# Patient Record
Sex: MALE | Race: WHITE | Employment: UNEMPLOYED | URBAN - METROPOLITAN AREA
[De-identification: names, ages, dates, MRNs, and addresses within clinical notes are randomized per-mention and may not be internally consistent; named-entity substitution may affect disease eponyms.]

---

## 2018-01-05 ENCOUNTER — TELEPHONE (OUTPATIENT)
Dept: FAMILY MEDICINE CLINIC | Facility: CLINIC | Age: 23
End: 2018-01-05

## 2018-02-23 ENCOUNTER — OFFICE VISIT (OUTPATIENT)
Dept: FAMILY MEDICINE CLINIC | Facility: CLINIC | Age: 23
End: 2018-02-23

## 2018-02-23 DIAGNOSIS — E72.03 LOWE SYNDROME (HCC): Primary | ICD-10-CM

## 2018-02-23 PROCEDURE — 99213 OFFICE O/P EST LOW 20 MIN: CPT | Performed by: FAMILY MEDICINE

## 2018-02-23 PROCEDURE — 99212 OFFICE O/P EST SF 10 MIN: CPT | Performed by: FAMILY MEDICINE

## 2018-02-23 NOTE — PROGRESS NOTES
HPI:    Patient ID: Nithya Dodson is a 26year old male. HPI  Patient presents with:  Referral: report for guardianship due to pt being 26 legal age referral for  dedra surgery, opthomology     Review of Systems   Constitutional: Negative.     Psyc

## 2018-03-09 ENCOUNTER — TELEPHONE (OUTPATIENT)
Dept: FAMILY MEDICINE CLINIC | Facility: CLINIC | Age: 23
End: 2018-03-09

## 2018-03-09 NOTE — TELEPHONE ENCOUNTER
Dr. Eileen Russell,   see [facility] probate division form, This form needs to be filled out for court. In your pending folder.  Thanks

## 2018-03-09 NOTE — TELEPHONE ENCOUNTER
Patient mother dropped off form for court that needs to be completed she already had court yesterday 03/08/18 but they still need the form please call patient mother to  form when ready placed in dr Destiny Du folder

## 2018-03-15 NOTE — TELEPHONE ENCOUNTER
Called all the [employer] extension and nobody picking up the phone, called IM extension and they gave me another extension, when I called x [phone], finally somebody picked up the phone, advised that need to talk to the roomer and GE number given, states th

## 2018-03-15 NOTE — TELEPHONE ENCOUNTER
Office staff=Tried calling Schpatrick[employer]'s office regarding the form but with no response, will send the message back to MD , mother is calling regarding the form completion, please call her when ready to be .   Called mother and advised that office is no

## 2018-03-15 NOTE — TELEPHONE ENCOUNTER
Called Myah roomer of Dr Fabricio Alas and advised about the situation, states that she will mention it to him again and will call me back. Myah  Called back and states that she just spoke with Dr Fabricio Alas and states that he cannot do it today .   Called

## 2018-09-10 ENCOUNTER — OFFICE VISIT (OUTPATIENT)
Dept: SURGERY | Facility: CLINIC | Age: 23
End: 2018-09-10
Payer: COMMERCIAL

## 2018-09-10 DIAGNOSIS — D23.72 BENIGN NEOPLASM OF SKIN OF LOWER LIMB, INCLUDING HIP, LEFT: Primary | ICD-10-CM

## 2018-09-10 PROCEDURE — 99243 OFF/OP CNSLTJ NEW/EST LOW 30: CPT | Performed by: PLASTIC SURGERY

## 2018-09-10 PROCEDURE — 99212 OFFICE O/P EST SF 10 MIN: CPT | Performed by: PLASTIC SURGERY

## 2018-09-10 NOTE — H&P (VIEW-ONLY)
Quintin Fabian is a 26year old male that presents with Patient presents with:  Lesion: L Dorsal foot  . REFERRED BY:  No ref.  provider found    Pacemaker: No  Latex Allergy: no  Coumadin: No  Plavix: No  Other anticoagulants: No  Cardiac stents: No OTHER (SEE COMMENTS)    Comment:All eye drops caused redeness             Toxic reaction with redness and discharge - likely             RICARDO toxicity  Risperidone             OTHER (SEE COMMENTS)    Comment:seizures    Past Medical History:   Diagnosis Munir Not Asked        Past Sunlamp Treatments for Acne: Not Asked        History of tanning: Not Asked        Hx of Spending [place] of Time in Sun: Not Asked        Bad sunburns in the past: Not Asked        Tanning Salons in the Past: Not Asked        Hx o Mallika Vance MD

## 2018-09-10 NOTE — PROGRESS NOTES
Pt request for surgery signed by pt and witnessed and signed by RN. Pt to take OTC meds post-operatively; pt instructed to call the office if post-op pain is not relieved w/OTC meds.   Pre-Surgical Instruction Handout, and Dressing Protector Handout, given

## 2018-09-10 NOTE — H&P
Tania Bello is a 26year old male that presents with Patient presents with:  Lesion: L Dorsal foot  . REFERRED BY:  No ref.  provider found    Pacemaker: No  Latex Allergy: no  Coumadin: No  Plavix: No  Other anticoagulants: No  Cardiac stents: No OTHER (SEE COMMENTS)    Comment:All eye drops caused redeness             Toxic reaction with redness and discharge - likely             RICARDO toxicity  Risperidone             OTHER (SEE COMMENTS)    Comment:seizures    Past Medical History:   Diagnosis Munir Not Asked        Past Sunlamp Treatments for Acne: Not Asked        History of tanning: Not Asked        Hx of Spending [place] of Time in Sun: Not Asked        Bad sunburns in the past: Not Asked        Tanning Salons in the Past: Not Asked        Hx o Ivelisse Davis MD

## 2018-09-13 ENCOUNTER — TELEPHONE (OUTPATIENT)
Dept: SURGERY | Facility: CLINIC | Age: 23
End: 2018-09-13

## 2018-09-13 DIAGNOSIS — D23.72 DERMATOFIBROMA OF LEFT LOWER LEG: Primary | ICD-10-CM

## 2018-09-13 NOTE — TELEPHONE ENCOUNTER
Spoke to [facility] @ [facility] Fredis Gillespie (call ref# [id number])    Effective date: 1/1/17    Individual Deduct: $ 300.00 with $ 0 met  Family Deduct: $ 900.00 with $ 300.00 met  Individual OOP: $ 1,000.00 with $ 0 met  Family OOP:$ 2,000.00 with $ 376.57 met  Co-

## 2018-09-21 ENCOUNTER — ANESTHESIA EVENT (OUTPATIENT)
Dept: SURGERY | Facility: HOSPITAL | Age: 23
End: 2018-09-21
Payer: COMMERCIAL

## 2018-09-21 ENCOUNTER — HOSPITAL ENCOUNTER (OUTPATIENT)
Facility: HOSPITAL | Age: 23
Setting detail: HOSPITAL OUTPATIENT SURGERY
Discharge: HOME | End: 2018-09-21
Attending: PLASTIC SURGERY | Admitting: PLASTIC SURGERY
Payer: COMMERCIAL

## 2018-09-21 ENCOUNTER — HOSPITAL DOCUMENTATION (OUTPATIENT)
Dept: SURGERY | Facility: CLINIC | Age: 23
End: 2018-09-21

## 2018-09-21 ENCOUNTER — ANESTHESIA (OUTPATIENT)
Dept: SURGERY | Facility: HOSPITAL | Age: 23
End: 2018-09-21
Payer: COMMERCIAL

## 2018-09-21 VITALS
RESPIRATION RATE: 14 BRPM | HEIGHT: 60 IN | BODY MASS INDEX: 14.72 KG/M2 | OXYGEN SATURATION: 100 % | WEIGHT: 75 LBS | TEMPERATURE: 98 F | DIASTOLIC BLOOD PRESSURE: 85 MMHG | SYSTOLIC BLOOD PRESSURE: 119 MMHG | HEART RATE: 80 BPM

## 2018-09-21 DIAGNOSIS — D23.72 BENIGN NEOPLASM OF SKIN OF LOWER LIMB, INCLUDING HIP, LEFT: Primary | ICD-10-CM

## 2018-09-21 PROCEDURE — 14041 TIS TRNFR F/C/C/M/N/A/G/H/F: CPT | Performed by: PLASTIC SURGERY

## 2018-09-21 PROCEDURE — 0HXNXZZ TRANSFER LEFT FOOT SKIN, EXTERNAL APPROACH: ICD-10-PCS | Performed by: PLASTIC SURGERY

## 2018-09-21 PROCEDURE — 0HBNXZZ EXCISION OF LEFT FOOT SKIN, EXTERNAL APPROACH: ICD-10-PCS | Performed by: PLASTIC SURGERY

## 2018-09-21 RX ORDER — MORPHINE SULFATE 4 MG/ML
2 INJECTION, SOLUTION INTRAMUSCULAR; INTRAVENOUS EVERY 10 MIN PRN
Status: DISCONTINUED | OUTPATIENT
Start: 2018-09-21 | End: 2018-09-21

## 2018-09-21 RX ORDER — SODIUM CHLORIDE, SODIUM LACTATE, POTASSIUM CHLORIDE, CALCIUM CHLORIDE 600; 310; 30; 20 MG/100ML; MG/100ML; MG/100ML; MG/100ML
INJECTION, SOLUTION INTRAVENOUS CONTINUOUS
Status: DISCONTINUED | OUTPATIENT
Start: 2018-09-21 | End: 2018-09-21

## 2018-09-21 RX ORDER — METOCLOPRAMIDE 10 MG/1
10 TABLET ORAL ONCE
Status: DISCONTINUED | OUTPATIENT
Start: 2018-09-21 | End: 2018-09-21 | Stop reason: HOSPADM

## 2018-09-21 RX ORDER — ACETAMINOPHEN 500 MG
500 TABLET ORAL EVERY 4 HOURS PRN
Status: DISCONTINUED | OUTPATIENT
Start: 2018-09-21 | End: 2018-09-21

## 2018-09-21 RX ORDER — CEFAZOLIN SODIUM/WATER 2 G/20 ML
2 SYRINGE (ML) INTRAVENOUS ONCE
Status: COMPLETED | OUTPATIENT
Start: 2018-09-21 | End: 2018-09-21

## 2018-09-21 RX ORDER — ACETAMINOPHEN 500 MG
1000 TABLET ORAL ONCE
Status: DISCONTINUED | OUTPATIENT
Start: 2018-09-21 | End: 2018-09-21 | Stop reason: HOSPADM

## 2018-09-21 RX ORDER — MORPHINE SULFATE 10 MG/ML
6 INJECTION, SOLUTION INTRAMUSCULAR; INTRAVENOUS EVERY 10 MIN PRN
Status: DISCONTINUED | OUTPATIENT
Start: 2018-09-21 | End: 2018-09-21

## 2018-09-21 RX ORDER — DEXAMETHASONE SODIUM PHOSPHATE 4 MG/ML
VIAL (ML) INJECTION AS NEEDED
Status: DISCONTINUED | OUTPATIENT
Start: 2018-09-21 | End: 2018-09-21 | Stop reason: SURG

## 2018-09-21 RX ORDER — HYDROCODONE BITARTRATE AND ACETAMINOPHEN 5; 325 MG/1; MG/1
2 TABLET ORAL AS NEEDED
Status: DISCONTINUED | OUTPATIENT
Start: 2018-09-21 | End: 2018-09-21

## 2018-09-21 RX ORDER — MIDAZOLAM HYDROCHLORIDE 1 MG/ML
INJECTION INTRAMUSCULAR; INTRAVENOUS AS NEEDED
Status: DISCONTINUED | OUTPATIENT
Start: 2018-09-21 | End: 2018-09-21 | Stop reason: SURG

## 2018-09-21 RX ORDER — ONDANSETRON 2 MG/ML
INJECTION INTRAMUSCULAR; INTRAVENOUS AS NEEDED
Status: DISCONTINUED | OUTPATIENT
Start: 2018-09-21 | End: 2018-09-21 | Stop reason: SURG

## 2018-09-21 RX ORDER — NALOXONE HYDROCHLORIDE 0.4 MG/ML
80 INJECTION, SOLUTION INTRAMUSCULAR; INTRAVENOUS; SUBCUTANEOUS AS NEEDED
Status: DISCONTINUED | OUTPATIENT
Start: 2018-09-21 | End: 2018-09-21

## 2018-09-21 RX ORDER — FAMOTIDINE 20 MG/1
20 TABLET ORAL ONCE
Status: DISCONTINUED | OUTPATIENT
Start: 2018-09-21 | End: 2018-09-21 | Stop reason: HOSPADM

## 2018-09-21 RX ORDER — KETAMINE HYDROCHLORIDE 50 MG/ML
INJECTION, SOLUTION, CONCENTRATE INTRAMUSCULAR; INTRAVENOUS AS NEEDED
Status: DISCONTINUED | OUTPATIENT
Start: 2018-09-21 | End: 2018-09-21 | Stop reason: SURG

## 2018-09-21 RX ORDER — HYDROCODONE BITARTRATE AND ACETAMINOPHEN 5; 325 MG/1; MG/1
1 TABLET ORAL AS NEEDED
Status: DISCONTINUED | OUTPATIENT
Start: 2018-09-21 | End: 2018-09-21

## 2018-09-21 RX ORDER — MORPHINE SULFATE 4 MG/ML
4 INJECTION, SOLUTION INTRAMUSCULAR; INTRAVENOUS EVERY 10 MIN PRN
Status: DISCONTINUED | OUTPATIENT
Start: 2018-09-21 | End: 2018-09-21

## 2018-09-21 RX ORDER — HALOPERIDOL 5 MG/ML
0.25 INJECTION INTRAMUSCULAR ONCE AS NEEDED
Status: DISCONTINUED | OUTPATIENT
Start: 2018-09-21 | End: 2018-09-21

## 2018-09-21 RX ORDER — LIDOCAINE HYDROCHLORIDE AND EPINEPHRINE 5; 5 MG/ML; UG/ML
INJECTION, SOLUTION INFILTRATION; PERINEURAL AS NEEDED
Status: DISCONTINUED | OUTPATIENT
Start: 2018-09-21 | End: 2018-09-21 | Stop reason: HOSPADM

## 2018-09-21 RX ORDER — ONDANSETRON 2 MG/ML
4 INJECTION INTRAMUSCULAR; INTRAVENOUS ONCE AS NEEDED
Status: DISCONTINUED | OUTPATIENT
Start: 2018-09-21 | End: 2018-09-21

## 2018-09-21 RX ADMIN — SODIUM CHLORIDE, SODIUM LACTATE, POTASSIUM CHLORIDE, CALCIUM CHLORIDE: 600; 310; 30; 20 INJECTION, SOLUTION INTRAVENOUS at 09:45:00

## 2018-09-21 RX ADMIN — DEXAMETHASONE SODIUM PHOSPHATE 4 MG: 4 MG/ML VIAL (ML) INJECTION at 10:00:00

## 2018-09-21 RX ADMIN — CEFAZOLIN SODIUM/WATER 2 G: 2 G/20 ML SYRINGE (ML) INTRAVENOUS at 09:53:00

## 2018-09-21 RX ADMIN — MIDAZOLAM HYDROCHLORIDE 2.5 MG: 1 INJECTION INTRAMUSCULAR; INTRAVENOUS at 09:38:00

## 2018-09-21 RX ADMIN — ONDANSETRON 4 MG: 2 INJECTION INTRAMUSCULAR; INTRAVENOUS at 10:04:00

## 2018-09-21 RX ADMIN — Medication 0.5 MG: at 09:38:00

## 2018-09-21 RX ADMIN — KETAMINE HYDROCHLORIDE 25 MG: 50 INJECTION, SOLUTION, CONCENTRATE INTRAMUSCULAR; INTRAVENOUS at 09:38:00

## 2018-09-21 NOTE — ANESTHESIA PREPROCEDURE EVALUATION
Anesthesia PreOp Note    HPI:     Juju Rashid is a 26year old male who presents for preoperative consultation requested by: Angela Fortune MD    Date of Surgery: 9/21/2018    Procedure(s):  EXCISION LESION LOWER EXTREMITY  Indication: Mass OP) Apply  to eye.  Disp:  Rfl:  Past Week at Unknown time       Current Facility-Administered Medications Ordered in Epic:  lactated ringers infusion  Intravenous Continuous Lo Gross MD   acetaminophen (TYLENOL EXTRA STRENGTH) tab 1,000 mg 1, Not Asked        Special Diet: Not Asked        Back Care: Not Asked        Exercise: Not Asked        Bike Helmet: Not Asked        Seat Belt: Not Asked        Self-Exams: Not Asked        Left Handed: Not Asked        Right Handed: Yes        Currently s family member of the nature of the anesthetic plan, benefits, risks including possible dental damage if relevant, major complications, and any alternative forms of anesthetic management.    All of the patient's questions were answered to the best of my abil

## 2018-09-21 NOTE — INTERVAL H&P NOTE
Pre-op Diagnosis: Mass    The above referenced H&P was reviewed by Jay Saldana MD on 9/21/2018, the patient was examined and no significant changes have occurred in the patient's condition since the H&P was performed.   I discussed with the patie

## 2018-09-21 NOTE — BRIEF OP NOTE
Pre-Operative Diagnosis: Mass     Post-Operative Diagnosis: Mass      Procedure Performed:   Procedure(s):  Excision mass left foot flap reconstruction    Surgeon(s) and Role:     * Magdalena Gutierrez MD - Primary    Assistant(s):        Surgical Findi

## 2018-09-21 NOTE — ANESTHESIA PROCEDURE NOTES
ANESTHESIA INTUBATION  Date/Time: 9/21/2018 9:49 AM  Urgency: elective    Airway not difficult    General Information and Staff    Patient location during procedure: OR  Anesthesiologist: Viviana Alcazar MD  Performed: anesthesiologist     Indications and Meme Cason

## 2018-09-21 NOTE — OPERATIVE REPORT
[facility]    PATIENT'S NAME: Ariadna Espinal   ATTENDING PHYSICIAN: Jamaal Fernández MD   OPERATING PHYSICIAN: Jamaal Fernández MD   PATIENT ACCOUNT#:   [id number]    LOCATION:  [place] [address]  MEDICAL RECORD #:   [id number] minutes. Gentle pressure was placed, and hemostasis was completely achieved prior to placement of the Dermabond. The patient tolerated the procedure well and left the operating suite in satisfactory condition.      Dictated By Todd Childress MD  d

## 2018-09-21 NOTE — ANESTHESIA POSTPROCEDURE EVALUATION
Patient: Kym Aguila    Procedure Summary     Date:  09/21/18 Room / Location:  [facility] OR 01 / [facility] OR    Anesthesia Start:  0043 Anesthesia Stop:      Procedure:  EXCISION LESION LOWER EXTREMITY (Left ) Diagnosis:  (Mass)    Surgeon:  Moise Mesa

## 2018-09-22 ENCOUNTER — TELEPHONE (OUTPATIENT)
Dept: SURGERY | Facility: CLINIC | Age: 23
End: 2018-09-22

## 2018-09-22 NOTE — TELEPHONE ENCOUNTER
Left message for PO patient to please call the office with any questions and/or concerns. Reminded patient of of next RN appointment on 10/02/18 and MD appointment on 10/11   Dr. Leatha Rodriguez notified.

## 2018-09-25 ENCOUNTER — TELEPHONE (OUTPATIENT)
Dept: SURGERY | Facility: CLINIC | Age: 23
End: 2018-09-25

## 2018-09-25 NOTE — TELEPHONE ENCOUNTER
Spoke w/ patient's mother, Rebecca Mc. Pt. told per Dr. Zaid Spaulding pathology results from surgery, 9/21/18 was a benign lesion. Pt. Verbalized understanding. Pt. Instructed to call the office w/ any questions and/or concerns. Dr. Zaid Spaulidng notified.

## 2018-10-02 ENCOUNTER — NURSE ONLY (OUTPATIENT)
Dept: SURGERY | Facility: CLINIC | Age: 23
End: 2018-10-02
Payer: COMMERCIAL

## 2018-10-02 DIAGNOSIS — D23.72 BENIGN NEOPLASM OF SKIN OF LOWER LIMB, INCLUDING HIP, LEFT: ICD-10-CM

## 2018-10-02 DIAGNOSIS — Z48.01 ENCOUNTER FOR CHANGE OR REMOVAL OF SURGICAL WOUND DRESSING: Primary | ICD-10-CM

## 2018-10-02 PROCEDURE — 99212 OFFICE O/P EST SF 10 MIN: CPT | Performed by: PLASTIC SURGERY

## 2018-10-02 NOTE — PROGRESS NOTES
Surgery 1: Mass L foot / flap  - Date: 09/21/18  - Days Since: 11  Pt arrived with mother, gait steady,wearing gym shoes. Denies pain, s/s of infection and need for analgesics. Mother states she had to redress L foot X 1.   Arrived with L foot dressing CD

## 2018-10-10 ENCOUNTER — TELEPHONE (OUTPATIENT)
Dept: FAMILY MEDICINE CLINIC | Facility: CLINIC | Age: 23
End: 2018-10-10

## 2018-10-10 NOTE — TELEPHONE ENCOUNTER
Pt mom is requesting if  can see pt the same day as sister. Pt is 26years old but is a disable child.          Mom wants this Friday 10/12 at 345 pt sister is schedule for 330 on 10/12

## 2018-10-11 ENCOUNTER — OFFICE VISIT (OUTPATIENT)
Dept: SURGERY | Facility: CLINIC | Age: 23
End: 2018-10-11
Payer: COMMERCIAL

## 2018-10-11 DIAGNOSIS — D23.72 BENIGN NEOPLASM OF SKIN OF LOWER LIMB, INCLUDING HIP, LEFT: Primary | ICD-10-CM

## 2018-10-11 PROCEDURE — 99212 OFFICE O/P EST SF 10 MIN: CPT | Performed by: PLASTIC SURGERY

## 2018-10-11 PROCEDURE — 99024 POSTOP FOLLOW-UP VISIT: CPT | Performed by: PLASTIC SURGERY

## 2018-10-11 NOTE — PROGRESS NOTES
Per Dr. Adonay Clark' evaluation, pt instructed to begin Eucerin massages. Eucerin massage demonstrated to pt and \"After Skin Surgery\" pamphlet given. Pt instructed on importance of sun block use for the first year after surgery.   Pt verbalized an underst

## 2018-10-11 NOTE — PROGRESS NOTES
Surgery 1: Mass L foot / flap  - Date: 09/21/18  - Days Since: 21  Pt with mother. Denies pain. Arrived with steri-trip off. Washing daily with soap and water. L foot scar healing well without s/s of infection, no edema, no erythema and no drainage.

## 2018-10-12 ENCOUNTER — OFFICE VISIT (OUTPATIENT)
Dept: FAMILY MEDICINE CLINIC | Facility: CLINIC | Age: 23
End: 2018-10-12
Payer: COMMERCIAL

## 2018-10-12 VITALS — BODY MASS INDEX: 19 KG/M2 | WEIGHT: 77.38 LBS

## 2018-10-12 DIAGNOSIS — E72.03 LOWE SYNDROME (HCC): ICD-10-CM

## 2018-10-12 DIAGNOSIS — Z00.00 ROUTINE GENERAL MEDICAL EXAMINATION AT A HEALTH CARE FACILITY: ICD-10-CM

## 2018-10-12 DIAGNOSIS — Z00.129 ENCOUNTER FOR ROUTINE CHILD HEALTH EXAMINATION WITHOUT ABNORMAL FINDINGS: Primary | ICD-10-CM

## 2018-10-12 PROCEDURE — 99395 PREV VISIT EST AGE 18-39: CPT | Performed by: FAMILY MEDICINE

## 2018-10-12 NOTE — PROGRESS NOTES
HPI:    Patient ID: Azeem Gardiner is a 26year old male.     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 Neurological: He is alert and oriented to person, place, and time. He has normal reflexes. Skin: Skin is warm, dry and intact. No rash noted. Psychiatric: His mood appears not anxious.  He expresses inappropr

## 2019-02-13 ENCOUNTER — TELEPHONE (OUTPATIENT)
Dept: FAMILY MEDICINE CLINIC | Facility: CLINIC | Age: 24
End: 2019-02-13

## 2019-02-13 NOTE — TELEPHONE ENCOUNTER
Dr. Yulissa aMson,   see state  [address] form for pt, last physical 10/2018. In your purple folder.  Thanks

## 2019-02-13 NOTE — TELEPHONE ENCOUNTER
Patient mother drop off forms to be filled out by provider, please call when ready. Forms place in provider bin.

## 2019-05-02 ENCOUNTER — MED REC SCAN ONLY (OUTPATIENT)
Dept: FAMILY MEDICINE CLINIC | Facility: CLINIC | Age: 24
End: 2019-05-02

## 2019-11-14 ENCOUNTER — TELEPHONE (OUTPATIENT)
Dept: FAMILY MEDICINE CLINIC | Facility: CLINIC | Age: 24
End: 2019-11-14

## 2019-11-14 NOTE — TELEPHONE ENCOUNTER
Need to check records.   She should probably get info from his specialist.  See how far back consults go on this EMR

## 2019-11-14 NOTE — TELEPHONE ENCOUNTER
Pt mom is looking for the earliest document that diagnosed pt with LOWES. Needs as a requirement for state of [facility]. Mom will  when letter is ready . please call mom on her cell.

## 2022-08-30 ENCOUNTER — OFFICE VISIT (OUTPATIENT)
Dept: FAMILY MEDICINE CLINIC | Facility: CLINIC | Age: 27
End: 2022-08-30
Payer: COMMERCIAL

## 2022-08-30 VITALS — WEIGHT: 81 LBS | HEIGHT: 60 IN | BODY MASS INDEX: 15.9 KG/M2 | TEMPERATURE: 98 F

## 2022-08-30 DIAGNOSIS — K64.9 HEMORRHOIDS, UNSPECIFIED HEMORRHOID TYPE: Primary | ICD-10-CM

## 2022-08-30 PROCEDURE — 99202 OFFICE O/P NEW SF 15 MIN: CPT | Performed by: FAMILY MEDICINE

## 2022-08-30 PROCEDURE — 3008F BODY MASS INDEX DOCD: CPT | Performed by: FAMILY MEDICINE

## 2022-08-30 RX ORDER — HYDROCORTISONE 25 MG/G
CREAM TOPICAL
Qty: 1 EACH | Refills: 2 | Status: SHIPPED | OUTPATIENT
Start: 2022-08-30

## 2023-07-12 ENCOUNTER — OFFICE VISIT (OUTPATIENT)
Dept: FAMILY MEDICINE CLINIC | Facility: CLINIC | Age: 28
End: 2023-07-12

## 2023-07-12 VITALS
BODY MASS INDEX: 16.76 KG/M2 | WEIGHT: 85.38 LBS | HEART RATE: 80 BPM | HEIGHT: 60 IN | RESPIRATION RATE: 20 BRPM | DIASTOLIC BLOOD PRESSURE: 73 MMHG | SYSTOLIC BLOOD PRESSURE: 109 MMHG | TEMPERATURE: 97 F

## 2023-07-12 DIAGNOSIS — Z01.818 PREOPERATIVE GENERAL PHYSICAL EXAMINATION: Primary | ICD-10-CM

## 2023-07-12 PROCEDURE — 3074F SYST BP LT 130 MM HG: CPT | Performed by: FAMILY MEDICINE

## 2023-07-12 PROCEDURE — 3078F DIAST BP <80 MM HG: CPT | Performed by: FAMILY MEDICINE

## 2023-07-12 PROCEDURE — 99213 OFFICE O/P EST LOW 20 MIN: CPT | Performed by: FAMILY MEDICINE

## 2023-07-12 PROCEDURE — 3008F BODY MASS INDEX DOCD: CPT | Performed by: FAMILY MEDICINE

## 2023-07-12 NOTE — PROGRESS NOTES
Subjective:   Patient ID: Fly Reyes is a 34year old male. Patient is here for for preoperative history and physical prior to having a teeth cleaning. The patient will be having [facility] for special needs patients. No acute issues or problems. Has hx of Lowe's syndrome. Patient denies any problems or issues with surgery/ anesthesia in the past.     ROS: unremarkable. History/Other:   Review of Systems   Constitutional: Negative. Negative for fever. HENT: Negative. Negative for mouth sores. Eyes: Negative. Respiratory: Negative. Negative for cough and shortness of breath. Cardiovascular: Negative. Negative for chest pain. Gastrointestinal: Negative. Genitourinary: Negative. Musculoskeletal: Negative. Skin: Negative. Neurological: Negative. Psychiatric/Behavioral: Negative. Current Outpatient Medications   Medication Sig Dispense Refill    potassium citrate-citric acid 1100-334 MG/5ML Oral Solution TAKE 20 ML BY MOUTH TWICE DAILY      hydrocortisone 2.5 % External Cream Apply to to affected area up to twice per day as needed for hemorrhoids. 1 each 2    PHOSPHA 250 NEUTRAL 155-852-130 MG Oral Tab   0    Cholecalciferol (VITAMIN D) 400 UNITS Oral Tab Take 1 tablet (400 Units total) by mouth daily. calcitRIOL (ROCALTROL) 1 MCG/ML Oral Solution Take 0.25 mL (0.25 mcg total) by mouth daily. Potassium Chloride (KAYCIEL) 20 MEQ/15ML (10%) Oral Solution Take 15 mL (20 mEq total) by mouth once. Travoprost (TRAVATAN Z OP) Apply  to eye. Allergies:  Latanoprost             OTHER (SEE COMMENTS)    Comment:All eye drops caused redeness             Toxic reaction with redness and discharge - likely             BAK toxicity  Risperidone             OTHER (SEE COMMENTS)    Comment:seizures    Objective:   Physical Exam  Constitutional:       Appearance: Normal appearance. He is well-developed. HENT:      Head: Normocephalic. Right Ear: Tympanic membrane, ear canal and external ear normal.      Left Ear: Tympanic membrane, ear canal and external ear normal.      Nose: Nose normal.      Mouth/Throat:      Mouth: Mucous membranes are moist.      Pharynx: No oropharyngeal exudate or posterior oropharyngeal erythema. Eyes:      Conjunctiva/sclera: Conjunctivae normal.   Cardiovascular:      Rate and Rhythm: Normal rate and regular rhythm. Heart sounds: Normal heart sounds. Pulmonary:      Effort: Pulmonary effort is normal. No respiratory distress. Breath sounds: Normal breath sounds. No wheezing or rales. Abdominal:      General: Abdomen is flat. There is no distension. Palpations: Abdomen is soft. Tenderness: There is no abdominal tenderness. Musculoskeletal:         General: Normal range of motion. Cervical back: Normal range of motion and neck supple. Skin:     General: Skin is warm. Neurological:      General: No focal deficit present. Mental Status: He is alert and oriented to person, place, and time. Sensory: No sensory deficit. Deep Tendon Reflexes: Reflexes are normal and symmetric. Psychiatric:         Mood and Affect: Mood normal.         Assessment & Plan:   Preoperative general physical examination:  - Exam unremarkable: Will clear patient for teeth cleaning with dentist. To call if any problems. Follow up as needed. Letter generated as requested. No orders of the defined types were placed in this encounter.       Meds This Visit:  Requested Prescriptions      No prescriptions requested or ordered in this encounter       Imaging & Referrals:  None

## 2023-07-13 ENCOUNTER — TELEPHONE (OUTPATIENT)
Dept: FAMILY MEDICINE CLINIC | Facility: CLINIC | Age: 28
End: 2023-07-13

## 2023-07-13 NOTE — TELEPHONE ENCOUNTER
PATIENT PRE OP PROGRESS NOTE FAX TO HER DENTIST OFFICE REQUESTED   [phone] .  NO FURTHER ACTION NEEDED

## 2023-07-13 NOTE — TELEPHONE ENCOUNTER
Patient's mother calling to state dentist is asking for more information for teeth cleaning. Stated they want office visit or full physical sent over via fax [phone], Attn: Timur Mccracken.

## 2023-09-11 ENCOUNTER — TELEPHONE (OUTPATIENT)
Dept: INTERNAL MEDICINE CLINIC | Facility: CLINIC | Age: 28
End: 2023-09-11

## 2023-09-11 DIAGNOSIS — K64.9 HEMORRHOIDS, UNSPECIFIED HEMORRHOID TYPE: Primary | ICD-10-CM

## 2023-09-11 RX ORDER — HYDROCORTISONE 25 MG/G
CREAM TOPICAL
Qty: 1 EACH | Refills: 0 | Status: SHIPPED | OUTPATIENT
Start: 2023-09-11

## 2023-09-11 NOTE — TELEPHONE ENCOUNTER
Spoke to patient's mother (name and  of patient verified). She reports hydrocortisone cream is not helping with patient's symptoms. She reports he has been reaching at the area and scratching. She would like to know if Dr. Stefano Horowitz could prescribe Anusol that worked well for her in the past. She states if it does not help, she will call back for general surgery recommendations. Patient's preferred pharmacy verified. Requested medication:  Anusol-hc  2.5%      1      apply by topical route 2 times every day to the affected area(s)     Per progress note by Dr. Stefano Horowitz 22:  Assessment & Plan:  Hemorrhoids, unspecified hemorrhoid type:  - After discussion, anusol cream as needed; Discussed good diet and fiber and avoidance of straining; Consider follow up with general surgery if worse or any sig symptoms. Dr. Stefano Horowitz, please advise on new prescription for Anusol or other recommendations. Thank you.

## 2023-09-11 NOTE — TELEPHONE ENCOUNTER
Message noted. May refill anusol as requested. Erx sent to listed pharmacy. To Please notify patient  May need prior authorization.

## 2023-09-12 ENCOUNTER — TELEPHONE (OUTPATIENT)
Dept: FAMILY MEDICINE CLINIC | Facility: CLINIC | Age: 28
End: 2023-09-12

## 2023-09-12 DIAGNOSIS — K64.9 HEMORRHOIDS, UNSPECIFIED HEMORRHOID TYPE: Primary | ICD-10-CM

## 2023-09-16 ENCOUNTER — TELEPHONE (OUTPATIENT)
Dept: INTERNAL MEDICINE CLINIC | Facility: CLINIC | Age: 28
End: 2023-09-16

## 2023-12-04 ENCOUNTER — TELEPHONE (OUTPATIENT)
Dept: FAMILY MEDICINE CLINIC | Facility: CLINIC | Age: 28
End: 2023-12-04

## 2023-12-04 NOTE — TELEPHONE ENCOUNTER
ANUSOL-HC 2.5 % External Cream, Apply to affected area up to twice per day as needed for hemorrhoids. , Disp: 1 each, Rfl: 0

## 2023-12-06 NOTE — TELEPHONE ENCOUNTER
Denied   9/13/2023 11:37 AM  Case ID: [id number] Sending user:  Julianna Capps [address] [place] [address]   Payer: [address] [place] [address]    [phone]

## 2024-01-22 NOTE — TELEPHONE ENCOUNTER
Patient called to request the below medication. She stated this is not a re-fill but an order for a 3rd series based on her insurance. Pharmacy on file verified.     Medication Quantity Refills Start End   hydrocortisone (PROCTOZONE-HC) 2.5 % External Cream 1 each 1 1/6/2024 --

## 2024-01-23 ENCOUNTER — OFFICE VISIT (OUTPATIENT)
Dept: FAMILY MEDICINE CLINIC | Facility: CLINIC | Age: 29
End: 2024-01-23

## 2024-01-23 VITALS
BODY MASS INDEX: 16.69 KG/M2 | HEART RATE: 100 BPM | WEIGHT: 85 LBS | SYSTOLIC BLOOD PRESSURE: 119 MMHG | DIASTOLIC BLOOD PRESSURE: 77 MMHG | HEIGHT: 60 IN | RESPIRATION RATE: 20 BRPM | TEMPERATURE: 98 F

## 2024-01-23 DIAGNOSIS — H92.12 OTORRHEA OF LEFT EAR: Primary | ICD-10-CM

## 2024-01-23 DIAGNOSIS — H60.502 ACUTE OTITIS EXTERNA OF LEFT EAR, UNSPECIFIED TYPE: ICD-10-CM

## 2024-01-23 PROCEDURE — 99213 OFFICE O/P EST LOW 20 MIN: CPT | Performed by: FAMILY MEDICINE

## 2024-01-23 PROCEDURE — 3008F BODY MASS INDEX DOCD: CPT | Performed by: FAMILY MEDICINE

## 2024-01-23 PROCEDURE — 3074F SYST BP LT 130 MM HG: CPT | Performed by: FAMILY MEDICINE

## 2024-01-23 PROCEDURE — 3078F DIAST BP <80 MM HG: CPT | Performed by: FAMILY MEDICINE

## 2024-01-23 RX ORDER — HYDROCORTISONE 25 MG/G
CREAM TOPICAL
Qty: 28 G | Refills: 0 | Status: SHIPPED | OUTPATIENT
Start: 2024-01-23

## 2024-01-23 RX ORDER — AMOXICILLIN 875 MG/1
875 TABLET, COATED ORAL 2 TIMES DAILY
Qty: 20 TABLET | Refills: 0 | Status: SHIPPED | OUTPATIENT
Start: 2024-01-23

## 2024-01-23 NOTE — TELEPHONE ENCOUNTER
Spoke to patient's mother clarification on message below. Mom is asking for 3rd out of 4 preferred creams from insurance Prior Authorization on 1/5/2024.    Mom states 2nd cream works a little bit better but not enough.    3rd cream pending for approval

## 2024-01-23 NOTE — TELEPHONE ENCOUNTER
Message noted: Chart reviewed and may refill medication as requested. Prescription sent to listed pharmacy. Pharmacy to notify patient.

## 2024-01-23 NOTE — PROGRESS NOTES
Subjective:   Patient ID: Shaan Elder is a 28 year old male.    Pt presents with some wax from the left ear as per mother. Mother states removed wax but some bleeding occurred. Mother concerned about infection.         History/Other:   Review of Systems   Constitutional:  Negative for fever.   HENT:  Positive for ear discharge. Negative for congestion and sore throat.      Current Outpatient Medications   Medication Sig Dispense Refill    neomycin-polymyxin-hydrocortisone 3.5-20168-9 Otic Solution Place 3 drops into the right ear 3 (three) times daily. 1 each 0    amoxicillin 875 MG Oral Tab Take 1 tablet (875 mg total) by mouth 2 (two) times daily. 20 tablet 0    hydrocortisone (PROCTOZONE-HC) 2.5 % External Cream Apply to hemorrhoid up to twice per day as needed. 1 each 1    potassium citrate-citric acid 1100-334 MG/5ML Oral Solution TAKE 20 ML BY MOUTH TWICE DAILY      hydrocortisone 2.5 % External Cream Apply to to affected area up to twice per day as needed for hemorrhoids. 1 each 2    PHOSPHA 250 NEUTRAL 155-852-130 MG Oral Tab   0    Cholecalciferol (VITAMIN D) 400 UNITS Oral Tab Take 1 tablet (400 Units total) by mouth daily.      calcitRIOL (ROCALTROL) 1 MCG/ML Oral Solution Take 0.25 mL (0.25 mcg total) by mouth daily.      Potassium Chloride (KAYCIEL) 20 MEQ/15ML (10%) Oral Solution Take 15 mL (20 mEq total) by mouth once.      Travoprost (TRAVATAN Z OP) Apply  to eye.       Allergies:  Allergies   Allergen Reactions    Latanoprost OTHER (SEE COMMENTS)     All eye drops caused redeness  Toxic reaction with redness and discharge - likely BAK toxicity    Risperidone OTHER (SEE COMMENTS)     seizures       Objective:   Physical Exam  Constitutional:       Appearance: Normal appearance.   HENT:      Right Ear: Tympanic membrane and ear canal normal. There is no impacted cerumen.      Left Ear: Tympanic membrane normal. There is no impacted cerumen.      Ears:      Comments: Irritation and dry skin. No  bleeding noted.   Neurological:      Mental Status: He is alert.         Assessment & Plan:   1. Otorrhea of left ear /   2. Acute otitis externa of left ear, unspecified type:  - After discussion with mother, cortisporin otic solution and amoxicillin as directed; To call if worse or not better; To refrain from putting anything in ear to remove wax; Follow up in one week if not resolved.         No orders of the defined types were placed in this encounter.      Meds This Visit:  Requested Prescriptions     Signed Prescriptions Disp Refills    neomycin-polymyxin-hydrocortisone 3.5-96585-9 Otic Solution 1 each 0     Sig: Place 3 drops into the right ear 3 (three) times daily.    amoxicillin 875 MG Oral Tab 20 tablet 0     Sig: Take 1 tablet (875 mg total) by mouth 2 (two) times daily.       Imaging & Referrals:  None

## 2024-03-25 ENCOUNTER — TELEPHONE (OUTPATIENT)
Dept: FAMILY MEDICINE CLINIC | Facility: CLINIC | Age: 29
End: 2024-03-25

## 2024-03-25 RX ORDER — HYDROCORTISONE 25 MG/G
CREAM TOPICAL
Qty: 1 EACH | Refills: 2 | Status: SHIPPED | OUTPATIENT
Start: 2024-03-25

## 2024-03-25 NOTE — TELEPHONE ENCOUNTER
RN left a complete message to patient mom's voice mail regarding the requested medication.   No need to call back but only if there is any question or concern, office number and hour provided.   Patient IS NOT MyChart active.

## 2024-03-25 NOTE — TELEPHONE ENCOUNTER
Action Requested: Summary for Provider     []  Critical Lab, Recommendations Needed  [] Need Additional Advice  []   FYI    []   Need Orders  [x] Need Medications Sent to Pharmacy  []  Other     SUMMARY: Angelica (mom ) asking for Anusol-HC 2.5% external cream    Stated has already try 4 other medication per insurance request.    Pharmacy verified    Reason for call: No chief complaint on file.  Onset: Data Unavailable

## 2025-01-16 ENCOUNTER — OFFICE VISIT (OUTPATIENT)
Dept: FAMILY MEDICINE CLINIC | Facility: CLINIC | Age: 30
End: 2025-01-16

## 2025-01-16 VITALS
HEIGHT: 60 IN | DIASTOLIC BLOOD PRESSURE: 90 MMHG | SYSTOLIC BLOOD PRESSURE: 130 MMHG | RESPIRATION RATE: 20 BRPM | WEIGHT: 91 LBS | BODY MASS INDEX: 17.87 KG/M2 | TEMPERATURE: 97 F | HEART RATE: 96 BPM

## 2025-01-16 DIAGNOSIS — M79.645 FINGER PAIN, LEFT: Primary | ICD-10-CM

## 2025-01-16 PROCEDURE — 99214 OFFICE O/P EST MOD 30 MIN: CPT | Performed by: FAMILY MEDICINE

## 2025-01-16 NOTE — PROGRESS NOTES
Subjective:   Patient ID: Shaan Elder is a 29 year old male.    Pt presents with recent history of left 5th finger deformity over the last few weeks. No known injury as per mother. Mom states pt does not appear to extend finger and also appears to avoid contact with that finger.  Past hx of orthopedic issues.  Pt unable to verbalize symptoms.         History/Other:   Review of Systems   Musculoskeletal:  Negative for joint swelling.     Current Outpatient Medications   Medication Sig Dispense Refill    hydrocortisone 2.5 % External Cream Apply to to affected area up to twice per day as needed for hemorrhoids. 1 each 2    hydrocortisone (PROCTOSOL HC) 2.5 % External Cream Place rectally 2 (two) times daily as needed. 1 each 0    neomycin-polymyxin-hydrocortisone 3.5-81245-1 Otic Solution Place 3 drops into the right ear 3 (three) times daily. 1 each 0    amoxicillin 875 MG Oral Tab Take 1 tablet (875 mg total) by mouth 2 (two) times daily. 20 tablet 0    hydrocortisone (PROCTO-MED HC) 2.5 % External Cream Apply to hemorrhoid up to twice per day as needed. 28 g 0    potassium citrate-citric acid 1100-334 MG/5ML Oral Solution TAKE 20 ML BY MOUTH TWICE DAILY      PHOSPHA 250 NEUTRAL 155-852-130 MG Oral Tab   0    Cholecalciferol (VITAMIN D) 400 UNITS Oral Tab Take 1 tablet (400 Units total) by mouth daily.      calcitRIOL (ROCALTROL) 1 MCG/ML Oral Solution Take 0.25 mL (0.25 mcg total) by mouth daily.      Potassium Chloride (KAYCIEL) 20 MEQ/15ML (10%) Oral Solution Take 15 mL (20 mEq total) by mouth once.      Travoprost (TRAVATAN Z OP) Apply  to eye.       Allergies:Allergies[1]    Objective:   Physical Exam  Constitutional:       Appearance: Normal appearance.   Musculoskeletal:      Comments: Left 5th finger. No bruising or swelling. Pt able to extend finger passively. No gross deformity noted.    Neurological:      Mental Status: He is alert.         Assessment & Plan:   1. Finger pain, left: not extending  finger:  - After discussion with patient, will check xray of the 5th finger. Follow up and further management after testing. Consider follow up with orthopedics pending xrays.           No orders of the defined types were placed in this encounter.      Meds This Visit:  Requested Prescriptions      No prescriptions requested or ordered in this encounter       Imaging & Referrals:  XR FINGER(S) (MIN 2 VIEWS), LEFT 5TH (CPT=73140)         [1]   Allergies  Allergen Reactions    Latanoprost OTHER (SEE COMMENTS)     All eye drops caused redeness  Toxic reaction with redness and discharge - likely RICARDO toxicity    Risperidone OTHER (SEE COMMENTS)     seizures

## 2025-01-17 ENCOUNTER — HOSPITAL ENCOUNTER (OUTPATIENT)
Dept: GENERAL RADIOLOGY | Facility: HOSPITAL | Age: 30
Discharge: HOME | End: 2025-01-17
Attending: FAMILY MEDICINE
Payer: MEDICAID

## 2025-01-17 DIAGNOSIS — M79.645 FINGER PAIN, LEFT: ICD-10-CM

## 2025-01-17 PROCEDURE — 73140 X-RAY EXAM OF FINGER(S): CPT | Performed by: FAMILY MEDICINE

## 2025-02-10 ENCOUNTER — OFFICE VISIT (OUTPATIENT)
Dept: FAMILY MEDICINE CLINIC | Facility: CLINIC | Age: 30
End: 2025-02-10

## 2025-02-10 VITALS
SYSTOLIC BLOOD PRESSURE: 144 MMHG | WEIGHT: 89 LBS | HEART RATE: 91 BPM | BODY MASS INDEX: 17.47 KG/M2 | OXYGEN SATURATION: 97 % | DIASTOLIC BLOOD PRESSURE: 96 MMHG | HEIGHT: 60 IN

## 2025-02-10 DIAGNOSIS — I10 PRIMARY HYPERTENSION: Primary | ICD-10-CM

## 2025-02-10 PROCEDURE — 99214 OFFICE O/P EST MOD 30 MIN: CPT | Performed by: FAMILY MEDICINE

## 2025-02-10 RX ORDER — METOPROLOL SUCCINATE 25 MG/1
25 TABLET, EXTENDED RELEASE ORAL DAILY
Qty: 30 TABLET | Refills: 1 | Status: SHIPPED | OUTPATIENT
Start: 2025-02-10

## 2025-02-10 NOTE — PROGRESS NOTES
Subjective:   Shaan Elder is a 30 year old male who presents for Blood Pressure (Follow up on blood pressure )   Mother monitors BP at home and regularly in the 130s over 90s recently.  His nephrologist wants it normal range and treated if necessary.     History/Other:    Chief Complaint Reviewed and Verified  Nursing Notes Reviewed and   Verified  Allergies Reviewed  Medications Reviewed  Problem List   Reviewed         Tobacco:  He has never smoked tobacco.    Current Outpatient Medications   Medication Sig Dispense Refill    metoprolol succinate ER 25 MG Oral Tablet 24 Hr Take 1 tablet (25 mg total) by mouth daily. 30 tablet 1    hydrocortisone 2.5 % External Cream Apply to to affected area up to twice per day as needed for hemorrhoids. 1 each 2    hydrocortisone (PROCTOSOL HC) 2.5 % External Cream Place rectally 2 (two) times daily as needed. 1 each 0    hydrocortisone (PROCTO-MED HC) 2.5 % External Cream Apply to hemorrhoid up to twice per day as needed. 28 g 0    potassium citrate-citric acid 1100-334 MG/5ML Oral Solution TAKE 20 ML BY MOUTH TWICE DAILY      PHOSPHA 250 NEUTRAL 155-852-130 MG Oral Tab   0    Cholecalciferol (VITAMIN D) 400 UNITS Oral Tab Take 1 tablet (400 Units total) by mouth daily.      calcitRIOL (ROCALTROL) 1 MCG/ML Oral Solution Take 0.25 mL (0.25 mcg total) by mouth daily.      Potassium Chloride (KAYCIEL) 20 MEQ/15ML (10%) Oral Solution Take 15 mL (20 mEq total) by mouth once.      Travoprost (TRAVATAN Z OP) Apply  to eye.           Review of Systems:  Review of Systems   Constitutional: Negative.          Objective:   BP (!) 144/96   Pulse 91   Ht 4' 5\" (1.346 m)   Wt 89 lb (40.4 kg)   SpO2 97%   BMI 22.28 kg/m²  Estimated body mass index is 22.28 kg/m² as calculated from the following:    Height as of this encounter: 4' 5\" (1.346 m).    Weight as of this encounter: 89 lb (40.4 kg).  Physical Exam  Vitals reviewed.   Cardiovascular:      Rate and Rhythm: Normal rate  and regular rhythm.       130/90    Assessment & Plan:   1. Primary hypertension (Primary)  Other orders  -     Metoprolol Succinate ER; Take 1 tablet (25 mg total) by mouth daily.  Dispense: 30 tablet; Refill: 1  Elevated HR.  Will try beta blocker first.  See back in 2-4 weeks. Monitor at home.  Side effect reviewed.       No follow-ups on file.    Tavo Rios DO, 2/10/2025, 12:25 PM

## 2025-02-13 RX ORDER — METOPROLOL SUCCINATE 25 MG/1
25 TABLET, EXTENDED RELEASE ORAL DAILY
Qty: 90 TABLET | Refills: 0 | OUTPATIENT
Start: 2025-02-13

## 2025-02-13 NOTE — TELEPHONE ENCOUNTER
Disp Refills Start End    metoprolol succinate ER 25 MG Oral Tablet 24 Hr 30 tablet 1 2/10/2025 --    Sig - Route: Take 1 tablet (25 mg total) by mouth daily. - Oral    Sent to pharmacy as: Metoprolol Succinate ER 25 MG Oral Tablet Extended Release 24 Hour (Toprol XL)    E-Prescribing Status: Receipt confirmed by pharmacy (2/10/2025 12:16 PM CST)      Pharmacy    [id number] [address] AT [facility] [place], [phone], [phone]

## 2025-03-10 ENCOUNTER — OFFICE VISIT (OUTPATIENT)
Dept: FAMILY MEDICINE CLINIC | Facility: CLINIC | Age: 30
End: 2025-03-10

## 2025-03-10 VITALS
OXYGEN SATURATION: 95 % | SYSTOLIC BLOOD PRESSURE: 127 MMHG | WEIGHT: 89 LBS | BODY MASS INDEX: 17.47 KG/M2 | HEIGHT: 60 IN | DIASTOLIC BLOOD PRESSURE: 88 MMHG | HEART RATE: 73 BPM

## 2025-03-10 DIAGNOSIS — I10 PRIMARY HYPERTENSION: ICD-10-CM

## 2025-03-10 DIAGNOSIS — Z01.818 PRE-OP EXAMINATION: Primary | ICD-10-CM

## 2025-03-10 DIAGNOSIS — K02.9 DENTAL CARIES: ICD-10-CM

## 2025-03-10 PROBLEM — E72.09: Status: ACTIVE | Noted: 2025-03-10

## 2025-03-10 PROCEDURE — 99214 OFFICE O/P EST MOD 30 MIN: CPT | Performed by: FAMILY MEDICINE

## 2025-03-10 RX ORDER — METOPROLOL SUCCINATE 25 MG/1
25 TABLET, EXTENDED RELEASE ORAL DAILY
Qty: 30 TABLET | Refills: 5 | Status: SHIPPED | OUTPATIENT
Start: 2025-03-10

## 2025-03-10 NOTE — PROGRESS NOTES
Subjective:   Shaan Elder is a 30 year old male who presents for Pre-Op (Pre-op for dentistry 3/24/25 fax [phone] [facility] )   Dental procedure under anesthesia    History/Other:    Chief Complaint Reviewed and Verified  Nursing Notes Reviewed and   Verified  Allergies Reviewed  Medications Reviewed  Problem List   Reviewed         Tobacco:  He has never smoked tobacco.    Current Outpatient Medications   Medication Sig Dispense Refill    metoprolol succinate ER 25 MG Oral Tablet 24 Hr Take 1 tablet (25 mg total) by mouth daily. 30 tablet 5    hydrocortisone 2.5 % External Cream Apply to to affected area up to twice per day as needed for hemorrhoids. 1 each 2    potassium citrate-citric acid 1100-334 MG/5ML Oral Solution TAKE 20 ML BY MOUTH TWICE DAILY      PHOSPHA 250 NEUTRAL 155-852-130 MG Oral Tab   0    Cholecalciferol (VITAMIN D) 400 UNITS Oral Tab Take 1 tablet (400 Units total) by mouth daily.      Potassium Chloride (KAYCIEL) 20 MEQ/15ML (10%) Oral Solution Take 15 mL (20 mEq total) by mouth once.           Review of Systems:  Review of Systems   Constitutional: Negative.    HENT: Negative.     Respiratory: Negative.     Cardiovascular: Negative.    Gastrointestinal: Negative.          Objective:   /88   Pulse 73   Ht 4' 5\" (1.346 m)   Wt 89 lb (40.4 kg)   SpO2 95%   BMI 22.28 kg/m²  Estimated body mass index is 22.28 kg/m² as calculated from the following:    Height as of this encounter: 4' 5\" (1.346 m).    Weight as of this encounter: 89 lb (40.4 kg).  Physical Exam  Vitals reviewed.   HENT:      Right Ear: External ear normal.      Left Ear: External ear normal.      Nose: Nose normal.      Mouth/Throat:      Mouth: Mucous membranes are moist.   Eyes:      Extraocular Movements: Extraocular movements intact.   Cardiovascular:      Rate and Rhythm: Normal rate and regular rhythm.      Pulses: Normal pulses.      Heart sounds: Normal heart sounds.   Pulmonary:       Effort: Pulmonary effort is normal.      Breath sounds: Normal breath sounds.   Skin:     General: Skin is warm and dry.   Psychiatric:         Mood and Affect: Mood normal.           Assessment & Plan:   1. Pre-op examination (Primary)  2. Dental caries  3. Primary hypertension  Other orders  -     Metoprolol Succinate ER; Take 1 tablet (25 mg total) by mouth daily.  Dispense: 30 tablet; Refill: 5    BP improved and will continue current medication for now.  Medically cleared for dental procedure under anesthesia.        No follow-ups on file.    Tavo Rios DO, 3/10/2025, 11:54 AM

## (undated) DEVICE — STERILE LATEX POWDER-FREE SURGICAL GLOVESWITH NITRILE COATING: Brand: PROTEXIS

## (undated) DEVICE — LOWER EXTREMITY: Brand: MEDLINE INDUSTRIES, INC.

## (undated) DEVICE — STANDARD HYPODERMIC NEEDLE,POLYPROPYLENE HUB: Brand: MONOJECT

## (undated) DEVICE — BANDAGE ROLL,100% COTTON, 6 PLY, LARGE: Brand: KERLIX

## (undated) DEVICE — ZIMMER® STERILE DISPOSABLE TOURNIQUET CUFF WITH PLC, DUAL PORT, SINGLE BLADDER, 18 IN. (46 CM)

## (undated) DEVICE — STERILE TETRA-FLEX CF, ELASTIC BANDAGEE, 3" X 5.5YD: Brand: TETRA-FLEX™CF

## (undated) DEVICE — SUTURE VICRYL 3-0 J497G

## (undated) DEVICE — DRAPE SHEET LG

## (undated) DEVICE — SOL  .9 1000ML BTL

## (undated) DEVICE — BANDAGE COBAN 5X2 TAN LTX

## (undated) NOTE — LETTER
2/23/2018              Maggy Mcnally [place] [id number]         To whom it may concern:    Hurman Runner is a patient at this clinic office and has a diagnosis of Powderly syndrome.   This diagnosis renders him dependent on h

## (undated) NOTE — LETTER
09/10/18      Patient: Ekta Corrigan  : 1995 Visit date: 9/10/2018    Dear Asher Harper,      I examined your patient in consultation today. There is a painful friable mass of the left foot dorsum. We will plan on excision.     Sulema Barthel

## (undated) NOTE — LETTER
7/12/2023          To Whom It May Concern:    Dilcia Saeed is currently under my medical care. Please be advised that he had his preoperative evaluation and exam for dental cleaning. His exam was unremarkable and he should be cleared for his procedure with you. He has not issues with previous procedure, cleanings and anesthesia in the past.         If you require additional information please contact our office.         Sincerely,    Mónica Silva MD          Document generated by:  Mónica Silva MD

## (undated) NOTE — LETTER
[facility] [employer] of Child Health Examination       Student's Name  Good [employer] Signature                                                                                                                                              Title                           Date    (If adding dates to the above immunization history section, put y ALLERGIES  (Food, drug, insect, other) MEDICATION  (List all prescribed or taken on a regular basis.)     Diagnosis of asthma?   Child wakes during the night coughing   Yes   No    Yes   No    Loss of function of one of paired organs? (eye/ear/kidney/testic Resistance (hypertension, dyslipidemia, polycystic ovarian syndrome, acanthosis nigricans)    No           At Risk  No   Lead Risk Questionnaire  Req'd for children 6 months thru 6 yrs enrolled in licensed or public school operated day care, ,  nu NEEDS/MODIFICATIONS required in the school setting  None DIETARY Needs/Restrictions     None   SPECIAL INSTRUCTIONS/DEVICES e.g. safety glasses, glass eye, chest protector for arrhythmia, pacemaker, prosthetic device, dental bridge, false teeth, athleticsu

## (undated) NOTE — LETTER
10/11/18      Patient: Jade Garza  : 1995 Visit date: 10/11/2018    Dear Tarun,      I examined your patient in follow-up today. He is 3 weeks post excision of a mass of the left foot with reconstruction.   He is done wel